# Patient Record
Sex: FEMALE | Race: WHITE | NOT HISPANIC OR LATINO | Employment: UNEMPLOYED | URBAN - METROPOLITAN AREA
[De-identification: names, ages, dates, MRNs, and addresses within clinical notes are randomized per-mention and may not be internally consistent; named-entity substitution may affect disease eponyms.]

---

## 2019-09-09 PROBLEM — R73.03 PREDIABETES: Status: ACTIVE | Noted: 2019-09-09

## 2019-09-09 PROBLEM — K21.9 GASTROESOPHAGEAL REFLUX DISEASE: Status: ACTIVE | Noted: 2019-09-09

## 2019-09-09 PROBLEM — E78.5 HYPERLIPIDEMIA: Status: ACTIVE | Noted: 2019-09-09

## 2019-09-09 PROBLEM — M79.10 MYALGIA: Status: ACTIVE | Noted: 2019-09-09

## 2019-09-09 PROBLEM — I10 ESSENTIAL HYPERTENSION: Status: ACTIVE | Noted: 2019-09-09

## 2020-02-13 ENCOUNTER — HOSPITAL ENCOUNTER (EMERGENCY)
Facility: HOSPITAL | Age: 75
Discharge: HOME | End: 2020-02-13
Attending: EMERGENCY MEDICINE
Payer: MEDICARE

## 2020-02-13 VITALS
TEMPERATURE: 99 F | RESPIRATION RATE: 18 BRPM | HEIGHT: 67 IN | OXYGEN SATURATION: 93 % | WEIGHT: 215 LBS | BODY MASS INDEX: 33.74 KG/M2 | HEART RATE: 72 BPM | SYSTOLIC BLOOD PRESSURE: 168 MMHG | DIASTOLIC BLOOD PRESSURE: 76 MMHG

## 2020-02-13 DIAGNOSIS — I10 HIGH BLOOD PRESSURE: ICD-10-CM

## 2020-02-13 DIAGNOSIS — I10 HYPERTENSION, UNSPECIFIED TYPE: Primary | ICD-10-CM

## 2020-02-13 PROCEDURE — 99283 EMERGENCY DEPT VISIT LOW MDM: CPT | Mod: 25

## 2020-02-13 PROCEDURE — 93005 ELECTROCARDIOGRAM TRACING: CPT

## 2020-02-13 PROCEDURE — 93010 ELECTROCARDIOGRAM REPORT: CPT | Mod: ,,, | Performed by: INTERNAL MEDICINE

## 2020-02-13 PROCEDURE — 93010 EKG 12-LEAD: ICD-10-PCS | Mod: ,,, | Performed by: INTERNAL MEDICINE

## 2020-02-14 NOTE — ED TRIAGE NOTES
"Pt to ER with c/o HTn x "few days." Pt reports increase in stress and eating lots of fried foods." + nausea and "floaters". + HA.   "

## 2020-02-14 NOTE — ED PROVIDER NOTES
Encounter Date: 2/13/2020    SCRIBE #1 NOTE: I, Christiane Castillo, am scribing for, and in the presence of,  Naresh James MD. I have scribed the following portions of the note - Other sections scribed: HPI/ROS/PE.       History     Chief Complaint   Patient presents with    Hypertension     The patient reports elevated BP reading at home. Patient states that she has been having headaches. Denies new vision symptoms.  Reports that she has hx HTN and has been compliant with meds.      This 74 y.o. female with a medical history of HTN and HLD presents to the ED for an emergent evaluation of elevated BP. Pt reports BP was in the 200s systolic today. There is an associated headache. Pt reports increased stress and she has been eating fast foods with high salt intake recently. Pt notes that she takes Coreg and Lisinopril, which she has been taking for the last 7-8 years. Pt last took antihypertensive medication at 16:00 this afternoon. No modifying factors. Pt states she just moved to [place] 2 months ago and does not have a PCP established at this time. Pt denies a hx of MI and CVA. She reports having multiple abdominal surgeries.Of note, pt reports that her grandson has been sick with a virus. Otherwise, no fever, chills, n/v/d, urinary complaints, numbness, weakness, speech difficulty, and any other associated symptoms. No further complaints at this time.     The history is provided by the patient. No  was used.     Review of patient's allergies indicates:   Allergen Reactions    Amoxicillin-pot clavulanate      Past Medical History:   Diagnosis Date    Hyperlipidemia     Hypertension      Past Surgical History:   Procedure Laterality Date    APPENDECTOMY      GALLBLADDER SURGERY      HYSTERECTOMY       No family history on file.  Social History     Tobacco Use    Smoking status: Former Smoker     Types: Cigarettes    Smokeless tobacco: Never Used   Substance Use Topics     Alcohol use: Not Currently    Drug use: Never     Review of Systems   Constitutional: Negative for chills and fever.   HENT: Negative for congestion, postnasal drip, rhinorrhea, sinus pressure, sneezing and sore throat.    Eyes: Negative for discharge and redness.   Respiratory: Negative for cough and shortness of breath.    Cardiovascular: Negative for chest pain.   Gastrointestinal: Negative for abdominal pain, blood in stool, constipation, diarrhea, nausea and vomiting.   Genitourinary: Negative for dysuria, hematuria, pelvic pain, vaginal bleeding, vaginal discharge and vaginal pain.   Musculoskeletal: Negative for arthralgias and myalgias.   Skin: Negative for rash and wound.   Neurological: Positive for headaches. Negative for weakness and light-headedness.   Hematological: Does not bruise/bleed easily.   Psychiatric/Behavioral: Negative for agitation and confusion. The patient is not nervous/anxious.        Physical Exam     Initial Vitals [02/13/20 1821]   BP Pulse Resp Temp SpO2   (!) 195/90 83 18 97.9 °F (36.6 °C) 97 %      MAP       --         Physical Exam    Nursing note and vitals reviewed.  Constitutional: She appears well-developed and well-nourished. She is not diaphoretic. No distress.   HENT:   Head: Normocephalic and atraumatic.   Right Ear: External ear normal.   Left Ear: External ear normal.   Eyes: Conjunctivae and EOM are normal. Pupils are equal, round, and reactive to light. Right eye exhibits no discharge. Left eye exhibits no discharge. No scleral icterus.   Neck: Normal range of motion. Neck supple. No thyromegaly present. No tracheal deviation present. No JVD present.   Cardiovascular: Normal rate, regular rhythm, normal heart sounds and intact distal pulses. Exam reveals no gallop and no friction rub.    No murmur heard.  Pulmonary/Chest: Breath sounds normal. No stridor. No respiratory distress. She has no wheezes. She has no rhonchi. She has no rales. She exhibits no tenderness.    Abdominal: Soft. She exhibits no distension and no mass. There is no tenderness. There is no rebound and no guarding.   Musculoskeletal: Normal range of motion. She exhibits no edema or tenderness.   Lymphadenopathy:     She has no cervical adenopathy.   Neurological: She is alert and oriented to person, place, and time. She has normal strength. GCS score is 15. GCS eye subscore is 4. GCS verbal subscore is 5. GCS motor subscore is 6.   SURYA with NGND's   Skin: Skin is warm and dry. Capillary refill takes less than 2 seconds. No rash and no abscess noted. No erythema. No pallor.   Psychiatric: She has a normal mood and affect. Her behavior is normal. Judgment and thought content normal.         ED Course   Procedures  Labs Reviewed - No data to display       Imaging Results    None       Pt arrived alert, afebrile, non-toxic in appearance, in no acute respiratory distress with VSS.  Pt has been on Lisinopril and Coreg for treatment of her HTN for approximately ten years according to the patient.  BP here progressively lowered throughout ED observation so no new medications were initiated.  Pt discharged and counseled on the need to return to the nearest emergency room if they experience any other concerning symptoms.  Pt counseled to F/U outpatient with a PCP over the next two to three days.    Naresh James MD                        Scribe Attestation:   Scribe #1: I performed the above scribed service and the documentation accurately describes the services I performed. I attest to the accuracy of the note.                          Clinical Impression:       ICD-10-CM ICD-9-CM   1. Hypertension, unspecified type I10 401.9   2. High blood pressure I10 401.9            I, Naresh James, personally performed the services described in this documentation. All medical record entries made by the scribe were at my direction and in my presence.  I have reviewed the chart and agree that the record reflects my  personal performance and is accurate and complete.                   Naresh James MD  02/13/20 2124